# Patient Record
Sex: FEMALE | Race: WHITE
[De-identification: names, ages, dates, MRNs, and addresses within clinical notes are randomized per-mention and may not be internally consistent; named-entity substitution may affect disease eponyms.]

---

## 2018-02-19 ENCOUNTER — HOSPITAL ENCOUNTER (OUTPATIENT)
Dept: HOSPITAL 63 - RAD | Age: 38
Discharge: HOME | End: 2018-02-19
Attending: NURSE PRACTITIONER
Payer: COMMERCIAL

## 2018-02-19 DIAGNOSIS — R07.9: Primary | ICD-10-CM

## 2018-02-19 PROCEDURE — 71046 X-RAY EXAM CHEST 2 VIEWS: CPT

## 2018-02-20 NOTE — RAD
Chest, 2 views, 2/19/2018:



History: Chest pain, cough, congestion, shortness of breath



Comparison is made to a study from 12/26/2014.  The heart size and pulmonary

vascularity are normal.  Minimal streaky basilar atelectasis/infiltrate has

developed posteriorly as seen on the lateral view, probably lying in the right

base.  The lungs are otherwise clear.  There is no evidence of pleural fluid.



IMPRESSION: Minimal right basilar atelectasis/infiltrate.

## 2018-03-08 ENCOUNTER — HOSPITAL ENCOUNTER (OUTPATIENT)
Dept: HOSPITAL 63 - DXRAD | Age: 38
Discharge: HOME | End: 2018-03-08
Attending: NURSE PRACTITIONER
Payer: COMMERCIAL

## 2018-03-08 DIAGNOSIS — J18.9: Primary | ICD-10-CM

## 2018-03-08 PROCEDURE — 71046 X-RAY EXAM CHEST 2 VIEWS: CPT

## 2018-03-08 NOTE — RAD
2 view CXR:



Clinical indications: Pneumonia.  Follow-up study



Findings: There has been interval clearing of the right lower lobe lung

infiltrate seen previously.  No new lung infiltrate or pleural effusion or

pulmonary edema or lung mass or pneumothorax is seen.  The heart size,

pulmonary vasculature, mediastinum and both heidi are unremarkable. The osseous

structures appear intact.



Impression: Interval clearing of right lower lobe lung infiltrate seen

previously.

## 2019-02-27 ENCOUNTER — HOSPITAL ENCOUNTER (OUTPATIENT)
Dept: HOSPITAL 63 - US | Age: 39
Discharge: HOME | End: 2019-02-27
Attending: PHYSICIAN ASSISTANT
Payer: COMMERCIAL

## 2019-02-27 DIAGNOSIS — E04.1: Primary | ICD-10-CM

## 2019-02-27 PROCEDURE — 76536 US EXAM OF HEAD AND NECK: CPT

## 2019-02-27 NOTE — RAD
NECK SOFT TISSUE

 

Clinical Indication: thyroidectomy 2016. pt is having neck fullness lat 

and medial to ml of neck.  

 

Comparison: Thyroid ultrasound, 11/9/2017 and 11/2/2016.

 

TECHNIQUE: Real-time ultrasound imaging of the thyroid and neck soft 

tissues is performed.

 

Findings: 

The thyroid isthmus and the left thyroid lobe are not identified.

 

Right thyroid lobe appears to be present. 

There is a solid, isoechoic, heterogeneous nodule measuring 20 x 16 x 19 

mm, previously measuring 19 x 17 x 18 mm. Therefore unchanged. This nodule

may have undergone FNA in 2016. Correlate to prior procedures. There may 

be a second smaller nodule more superiorly measuring up to 8 mm.

 

There is a prominent but benign-appearing lymph node in the right neck 

with a normal fatty hilum and no asymmetric cortical thickening. There is 

a benign lymph node in the left neck.

 

IMPRESSION:

1.  Dominant right thyroid nodule is stable.

2.  No abnormal lymph nodes are seen in the neck.

 

Electronically signed by: Aditya Crouch MD (2/27/2019 12:37 PM) [id number]

## 2021-06-03 ENCOUNTER — HOSPITAL ENCOUNTER (OUTPATIENT)
Dept: HOSPITAL 63 - RAD | Age: 41
End: 2021-06-03
Attending: NURSE PRACTITIONER
Payer: COMMERCIAL

## 2021-06-03 DIAGNOSIS — J22: Primary | ICD-10-CM

## 2021-06-03 PROCEDURE — 71046 X-RAY EXAM CHEST 2 VIEWS: CPT

## 2021-06-03 NOTE — RAD
EXAM:  XR CHEST 2V 6/3/2021 10:24 AM



CLINICAL INDICATION:  Shortness of breath, chest pain, history of pneumonia



COMPARISON:  3/8/2018



TECHNIQUE:  PA and lateral views of the chest



FINDINGS:  The heart and mediastinum are normal.  Lungs are adequately expanded.  No consolidation, p
leural effusion, or pneumothorax.  Pulmonary vascularity is normal.  The thoracic skeleton is intact.




IMPRESSION: No acute cardiopulmonary abnormality.



Electronically signed by: Jayleen Ritter MD (6/3/2021 10:42 AM) [id number]

## 2022-03-01 ENCOUNTER — HOSPITAL ENCOUNTER (OUTPATIENT)
Dept: HOSPITAL 63 - MAMMO | Age: 42
End: 2022-03-01
Attending: PHYSICIAN ASSISTANT
Payer: COMMERCIAL

## 2022-03-01 DIAGNOSIS — Z12.31: Primary | ICD-10-CM

## 2022-03-01 PROCEDURE — 77063 BREAST TOMOSYNTHESIS BI: CPT

## 2022-03-01 PROCEDURE — 77067 SCR MAMMO BI INCL CAD: CPT

## 2022-03-01 NOTE — RAD
INDICATION: 41 years of age asymptomatic female patient presents for screening mammography. History o
f benign left breast biopsy. No personal or family history of breast cancer.



TECHNIQUE:  Full field craniocaudal and mediolateral oblique images of both breasts were obtained usi
ng digital technique with tomosynthesis and also analyzed with computer-aided detection software.



COMPARISON: Prior mammographic imaging dating back to   10/31/2016.



BREAST COMPOSITION: Category C: The breast tissue is heterogeneously dense, which could obscure detec
tion of small masses.



FINDINGS:



No suspicious masses, microcalcifications or architectural distortion is present to suggest malignanc
y in either breast. Bilateral circumscribed masses, statistically benign. Postbiopsy changes in the l
eft breast.





The visualized axillae are unremarkable. 



IMPRESSION: No mammographic evidence of malignancy. 



RECOMMENDATION: Annual screening mammography is recommended, unless clinically indicated sooner based
 on symptoms or change in physical exam.



BIRADS 2: BENIGN 



This study was interpreted with the benefit of Computerized Aided Detection (CAD).



?Your patient's mammogram demonstrates that she has dense breast tissue (breast density category C or
 D), which could hide abnormalities, and if she has other risk factors for breast cancer that have be
en identified, she might benefit from supplemental screening tests that may be suggested by you as he
r ordering physician. Dense breast tissue, in and of itself, is a relatively common condition. Theref
ore, this information is not provided to cause undue concern, but rather to raise your awareness and 
to promote discussion with your patient regarding the presence of other risk factors, in addition to 
dense breast tissue. Your patient's mammography results will be sent to her.



Patient information is entered into the reminder system with a target due date for the next screening
 mammogram.



Mammography is the most sensitive method for finding small breast cancers, but it does not detect the
m all and is not a substitute for careful clinical examination. A negative mammogram does not negate 
a clinically suspicious finding and should not result in delay in biopsying a clinically suspicious a
bnormality.



 "Our facility is accredited by the [employer] Mammography Program."



Electronically signed by: Thai Connor DO (3/1/2022 2:55 PM) [id number]